# Patient Record
Sex: FEMALE | Race: WHITE | NOT HISPANIC OR LATINO | URBAN - METROPOLITAN AREA
[De-identification: names, ages, dates, MRNs, and addresses within clinical notes are randomized per-mention and may not be internally consistent; named-entity substitution may affect disease eponyms.]

---

## 2019-08-08 ENCOUNTER — IMPORTED ENCOUNTER (OUTPATIENT)
Dept: URBAN - METROPOLITAN AREA CLINIC 31 | Facility: CLINIC | Age: 64
End: 2019-08-08

## 2019-08-08 PROBLEM — E11.9: Noted: 2019-08-08

## 2019-08-08 PROBLEM — H25.13: Noted: 2019-08-08

## 2019-08-08 PROCEDURE — 92015 DETERMINE REFRACTIVE STATE: CPT

## 2019-08-08 PROCEDURE — 92004 COMPRE OPH EXAM NEW PT 1/>: CPT

## 2019-08-08 NOTE — PATIENT DISCUSSION
1. DM II without sign of Diabetic Retinopathy OU:  Discussed the pathophysiology of diabetes and its effect on the eye. Stressed the importance of regular followup and good control of BS BP and Lipids to avoid future complications. 12 mos CE2. Nuclear Sclerotic Cataract OU: Explained how cataracts can effect vision. Recommend clinical observation. The patient was advised to contact us if any change or worsening of vision. 3. Refractive error stability? Will use OTC readers for now. RTC if wants re-refracted with percieved power change.

## 2021-01-13 ENCOUNTER — IMPORTED ENCOUNTER (OUTPATIENT)
Dept: URBAN - METROPOLITAN AREA CLINIC 31 | Facility: CLINIC | Age: 66
End: 2021-01-13

## 2021-01-13 PROBLEM — H25.13: Noted: 2021-01-13

## 2021-01-13 PROBLEM — E11.9: Noted: 2021-01-13

## 2021-01-13 PROCEDURE — 92014 COMPRE OPH EXAM EST PT 1/>: CPT

## 2021-01-13 PROCEDURE — 92015 DETERMINE REFRACTIVE STATE: CPT

## 2022-04-01 ASSESSMENT — TONOMETRY
OS_IOP_MMHG: 17
OS_IOP_MMHG: 17
OD_IOP_MMHG: 17
OD_IOP_MMHG: 17

## 2022-04-01 ASSESSMENT — VISUAL ACUITY
OS_SC: 20/70
OS_SC: J319''
OD_CC: 20/15
OD_SC: 20/50
OS_CC: 20/15
OD_SC: J219''
OS_CC: 20/20
OD_CC: 20/20